# Patient Record
(demographics unavailable — no encounter records)

---

## 2024-11-29 NOTE — HISTORY OF PRESENT ILLNESS
[FreeTextEntry1] : 42-year-old woman referred for vestibular symptoms.  Patient reports developing intense vertigo in November 2022 3 weeks after a bout of COVID characterized by spinning sensation and nausea.  She was prescribed meclizine and symptoms improved over weeks.  About 6 months later developed a similar episode that seemed positional related to her head movement while lying down associated with spinning and nausea that again resolved over weeks.  She denied headache during those events.  More recently about a week ago she developed a similar episode.  She has noticed some baseline imbalance over the years as well as a sense of being pushed in a certain direction sometimes feeling as though she has a force pushing her to the right.  She has also had an episode remotely about 12 years ago when she developed left-sided numbness that lasted minutes and then resolved.

## 2024-11-29 NOTE — PHYSICAL EXAM
[FreeTextEntry1] :   Vitals: unrevealing   Exam:   AO3. Normally conversant. full affect. Follows commands, names, and repeats. Good attention.   PERRL, VFF, EOMI, no nystagmus, face symmetric, TUP at midline.   Motor:       R:    L: Del      5    5 Bi      5    5 Tri      5    5 Wrist Extensors   5    5 Finger abductors   5    5      5    5   HF      5    5 KE      5    5 KF      5    5 DF      5    5 PF      5    5   Tone     R    L UE      0    0 LE      0    0   Sensory    RUE   LUE   RLE  LLE LT      +    +   +   + Vib      +    +   +   + JPS      +    +   +   + PP      +    +   +   + Temp     +    +   +   +   Reflexes:       R    L   Biceps   2 2 BR    2 2 Pat     3 3 AJ     3 3   TOES     F    F   Coordination:       R    L  FTN     0    0 ONIEL     0    0 HTS     0    0   Other           Gait: normal, can heel, toe, tandem

## 2024-11-29 NOTE — ASSESSMENT
[FreeTextEntry1] : 42-year-old woman with recurrent acute vestibular syndrome as well as a remote episode over a decade ago of left-sided numbness that lasted minutes.  She endorses baseline sense of imbalance. She denies prominent headache associated with the vestibular symptoms.  Differential diagnosis is broad and includes migrainous phenomena such as vestibular migraine, BPPV [although [place]-Hallpike was negative], and other etiologies that will need to be ruled out on MRI including CNS demyelination, other inflammatory etiologies including parainfectious vestibular dysfunction, and albeit less likely neoplasm such as vestibular schwannoma [hearing is unaffected].  Plan: MR brain with IAC protocol with and without gadolinium to rule out structural lesion causing acute vestibular syndrome MR cervical spine to rule out myelopathy given the remote history of left-sided numbness and brisk reflexes at the patella Rest of workup depending what the MRI shows Referral to vestibular rehab Follow-up in 1 month

## 2024-12-27 NOTE — ASSESSMENT
[FreeTextEntry1] : 42-year-old woman with recurrent acute vestibular syndrome as well as a remote episode over a decade ago of left-sided numbness that lasted minutes.  She endorses baseline sense of imbalance. She denies prominent headache associated with the vestibular symptoms.  MRI of the brain and cervical spine unrevealing essentially ruling out CNS demyelination, and there is no evidence of vestibular schwannoma or other structural lesion.  Unclear etiology however possibilities include persistent postural perceptual dizziness, orthostatic dizziness, migrainous phenomena such as vestibular migraine.  Plan: Check orthostatics if symptoms recur Referral to vestibular rehab as needed Follow-up as needed

## 2024-12-27 NOTE — HISTORY OF PRESENT ILLNESS
[FreeTextEntry1] : 42-year-old woman referred for vestibular symptoms.  Patient reports developing intense vertigo in November 2022 3 weeks after a bout of COVID characterized by spinning sensation and nausea.  She was prescribed meclizine and symptoms improved over weeks.  About 6 months later developed a similar episode that seemed positional related to her head movement while lying down associated with spinning and nausea that again resolved over weeks.  She denied headache during those events.  More recently about a week ago she developed a similar episode.  She has noticed some baseline imbalance over the years as well as a sense of being pushed in a certain direction sometimes feeling as though she has a force pushing her to the right.  She has also had an episode remotely about 12 years ago when she developed left-sided numbness that lasted minutes and then resolved.  December 27, 2024 No interval recurrence of vestibular symptoms.  Essentially asymptomatic at this time, without imbalance diplopia or any dizziness.

## 2024-12-27 NOTE — PHYSICAL EXAM
[FreeTextEntry1] :   Vitals: unrevealing  [place]-Hallpike negative  Exam:   AO3. Normally conversant. full affect. Follows commands, names, and repeats. Good attention.   PERRL, VFF, EOMI, no nystagmus, face symmetric, TUP at midline.   Motor:       R:    L: Del      5    5 Bi      5    5 Tri      5    5 Wrist Extensors   5    5 Finger abductors   5    5      5    5   HF      5    5 KE      5    5 KF      5    5 DF      5    5 PF      5    5   Tone     R    L UE      0    0 LE      0    0   Sensory    RUE   LUE   RLE  LLE LT      +    +   +   + Vib      +    +   +   + JPS      +    +   +   + PP      +    +   +   + Temp     +    +   +   +   Reflexes:       R    L   Biceps   2 2 BR    2 2 Pat     3 3 AJ     3 3   TOES     F    F   Coordination:       R    L  FTN     0    0 ONIEL     0    0 HTS     0    0   Other           Gait: normal, can heel, toe, tandem

## 2025-07-07 NOTE — HISTORY OF PRESENT ILLNESS
[Normal Amount/Duration] :  normal amount and duration [Frequency: Q ___ days] : menstrual periods occur approximately every [unfilled] days [Currently Active] : currently active [Men] : men [No] : No [Patient refuses STI testing] : Patient refuses STI testing